# Patient Record
Sex: MALE | Race: OTHER | Employment: OTHER | URBAN - METROPOLITAN AREA
[De-identification: names, ages, dates, MRNs, and addresses within clinical notes are randomized per-mention and may not be internally consistent; named-entity substitution may affect disease eponyms.]

---

## 2018-05-10 NOTE — PATIENT DISCUSSION
REVIEWED OPTION OF AVASTIN, AND EXPLAINED PREFERENCE BY [employer], BUT PATIENT WANTS TO USE FDA APPROVED MEDICATION - THEREFORE TO PROCEED WITH A TRIAL OF EYLEA.

## 2018-05-10 NOTE — PATIENT DISCUSSION
Reviewed OPTIONS including AVASTIN/EYLEA/LUCENTIS and patient wants to proceed with EYLEA treatment AND REPEAT EVERY 5 WKS X 2 - MILD EDEMA - NEW.

## 2018-05-10 NOTE — PATIENT DISCUSSION
MILD - TO FOLLOW - IF HE HAS CAT SURGERY WOULD RECOM EVAL AND ANTI-VEGF TX FIRST TO BE ON THE SAFE SIDE.

## 2018-05-25 NOTE — PATIENT DISCUSSION
REVIEWED RISK OF INFLAMMATION WITH EYLEA AND PT WANTS TO SWITCH TO LUCENTIS AND TO REPEAT IT EVERY 5 WKS X 2.

## 2018-05-25 NOTE — PROCEDURE NOTE: CLINICAL
PROCEDURE NOTE: Lucentis 0.3mg PFS OS. Diagnosis: Diabetic Macular Edema. Prior to injection, risks/benefits/alternatives discussed including but not limited to infection, loss of vision or eye, hemorrhage, cataract, glaucoma, retinal tears or detachment. The patient wished to proceed with treatment. Topical anesthesia was induced with Alcaine. Additional anesthesia was achieved using drop(s) or injection checked above. A drop of Povidone-iodine 5% ophthalmic solution was instilled over the injection site and in the inferior fornix. Betadine prep was performed. A single use prefilled syringe of intravitreal Lucentis 0.3mg/0.05ml was used and excess discarded. The needle was passed 3.0 mm posterior to the limbus in pseudophakic patients, and 3.5 mm posterior to the limbus in phakic patients. The remainder of the Lucentis 0.3mg in the single-use vial was then discarded in a medical waste disposal container. The eye was irrigated with sterile irrigating solution. Patient tolerated the procedure well. There were no complications. Post procedure instructions given. CF vision checked. Injection Time 10:08AM. The patient was instructed to return for re-evaluation in approximately 4-12 weeks depending on his/her condition and was told to call immediately if vision decreases and/or if his/her eye becomes red, painful, and/or light sensitive. The patient was instructed to go to the emergency room or call 911 if unable to reach the doctor within an hour or two of trying or calling. Eli Durham

## 2018-05-25 NOTE — PATIENT DISCUSSION
ON 5 10 18 - REVIEWED OPTION OF AVASTIN, AND EXPLAINED PREFERENCE BY [employer], BUT PATIENT WANTS TO USE FDA APPROVED MEDICATION - THEREFORE TO PROCEED WITH A TRIAL OF EYLEA.

## 2018-07-06 NOTE — PROCEDURE NOTE: CLINICAL
PROCEDURE NOTE: Lucentis 0.3mg PFS OS. Diagnosis: Diabetic Macular Edema. Prior to injection, risks/benefits/alternatives discussed including but not limited to infection, loss of vision or eye, hemorrhage, cataract, glaucoma, retinal tears or detachment. The patient wished to proceed with treatment. Topical anesthesia was induced with Alcaine. Additional anesthesia was achieved using drop(s) or injection checked above. A drop of Povidone-iodine 5% ophthalmic solution was instilled over the injection site and in the inferior fornix. Betadine prep was performed. A single use prefilled syringe of intravitreal Lucentis 0.3mg/0.05ml was used and excess discarded. The needle was passed 3.0 mm posterior to the limbus in pseudophakic patients, and 3.5 mm posterior to the limbus in phakic patients. The remainder of the Lucentis 0.3mg in the single-use vial was then discarded in a medical waste disposal container. The eye was irrigated with sterile irrigating solution. Patient tolerated the procedure well. There were no complications. Post procedure instructions given. CF vision checked. Injection Time 10:10 AM. The patient was instructed to return for re-evaluation in approximately 4-12 weeks depending on his/her condition and was told to call immediately if vision decreases and/or if his/her eye becomes red, painful, and/or light sensitive. The patient was instructed to go to the emergency room or call 911 if unable to reach the doctor within an hour or two of trying or calling. Sandra De Paz

## 2018-08-17 NOTE — PROCEDURE NOTE: CLINICAL
PROCEDURE NOTE: Lucentis 0.3mg PFS OS. Diagnosis: Diabetic Macular Edema. Anesthesia: Topical. Prep: Betadine Flush. Prior to injection, risks/benefits/alternatives discussed including but not limited to infection, loss of vision or eye, hemorrhage, cataract, glaucoma, retinal tears or detachment. The patient wished to proceed with treatment. Topical anesthesia was induced with Alcaine. Additional anesthesia was achieved using drop(s) or injection checked above. A drop of Povidone-iodine 5% ophthalmic solution was instilled over the injection site and in the inferior fornix. Betadine prep was performed. A single use prefilled syringe of intravitreal Lucentis 0.3mg/0.05ml was used and excess discarded. The needle was passed 3.0 mm posterior to the limbus in pseudophakic patients, and 3.5 mm posterior to the limbus in phakic patients. The remainder of the Lucentis 0.3mg in the single-use vial was then discarded in a medical waste disposal container. The eye was irrigated with sterile irrigating solution. Patient tolerated the procedure well. There were no complications. Post procedure instructions given. CF vision checked. Injection Time 10:11AM. The patient was instructed to return for re-evaluation in approximately 4-12 weeks depending on his/her condition and was told to call immediately if vision decreases and/or if his/her eye becomes red, painful, and/or light sensitive. The patient was instructed to go to the emergency room or call 911 if unable to reach the doctor within an hour or two of trying or calling. Sandra De Paz

## 2018-09-28 NOTE — PATIENT DISCUSSION
LUCENTIS AND REPEAT EVERY 6 WKS X 2 - MILD EDEMA IMPROVING AT 6 WKS - BUT NOT CLEAR - RETX AND KEEP 6 WKS.

## 2018-10-05 NOTE — PROCEDURE NOTE: CLINICAL
PROCEDURE NOTE: Lucentis 0.3mg PFS OS. Diagnosis: Diabetic Macular Edema. Prior to injection, risks/benefits/alternatives discussed including but not limited to infection, loss of vision or eye, hemorrhage, cataract, glaucoma, retinal tears or detachment. The patient wished to proceed with treatment. Topical anesthesia was induced with Alcaine. Additional anesthesia was achieved using drop(s) or injection checked above. A drop of Povidone-iodine 5% ophthalmic solution was instilled over the injection site and in the inferior fornix. Betadine prep was performed. A single use prefilled syringe of intravitreal Lucentis 0.3mg/0.05ml was used and excess discarded. The needle was passed 3.0 mm posterior to the limbus in pseudophakic patients, and 3.5 mm posterior to the limbus in phakic patients. The remainder of the Lucentis 0.3mg in the single-use vial was then discarded in a medical waste disposal container. The eye was irrigated with sterile irrigating solution. Patient tolerated the procedure well. There were no complications. Post procedure instructions given. CF vision checked. Injection Time *. The patient was instructed to return for re-evaluation in approximately 4-12 weeks depending on his/her condition and was told to call immediately if vision decreases and/or if his/her eye becomes red, painful, and/or light sensitive. The patient was instructed to go to the emergency room or call 911 if unable to reach the doctor within an hour or two of trying or calling. Poli Palomares

## 2018-11-16 NOTE — PROCEDURE NOTE: CLINICAL
PROCEDURE NOTE: Lucentis 0.3mg PFS OS. Diagnosis: Diabetic Macular Edema. Anesthesia: Topical. Prep: Betadine Flush. Prior to injection, risks/benefits/alternatives discussed including but not limited to infection, loss of vision or eye, hemorrhage, cataract, glaucoma, retinal tears or detachment. The patient wished to proceed with treatment. Topical anesthesia was induced with Alcaine. Additional anesthesia was achieved using drop(s) or injection checked above. A drop of Povidone-iodine 5% ophthalmic solution was instilled over the injection site and in the inferior fornix. Betadine prep was performed. A single use prefilled syringe of intravitreal Lucentis 0.3mg/0.05ml was used and excess discarded. The needle was passed 3.0 mm posterior to the limbus in pseudophakic patients, and 3.5 mm posterior to the limbus in phakic patients. The remainder of the Lucentis 0.3mg in the single-use vial was then discarded in a medical waste disposal container. The eye was irrigated with sterile irrigating solution. Patient tolerated the procedure well. There were no complications. Post procedure instructions given. CF vision checked. Injection Time 1:32PM. The patient was instructed to return for re-evaluation in approximately 4-12 weeks depending on his/her condition and was told to call immediately if vision decreases and/or if his/her eye becomes red, painful, and/or light sensitive. The patient was instructed to go to the emergency room or call 911 if unable to reach the doctor within an hour or two of trying or calling. Anahi Toscano

## 2019-01-04 NOTE — PROCEDURE NOTE: CLINICAL
PROCEDURE NOTE: Lucentis 0.3mg PFS OS. Diagnosis: Diabetic Macular Edema. Anesthesia: Topical. Prep: Betadine Flush. Prior to injection, risks/benefits/alternatives discussed including but not limited to infection, loss of vision or eye, hemorrhage, cataract, glaucoma, retinal tears or detachment. The patient wished to proceed with treatment. Topical anesthesia was induced with Alcaine. Additional anesthesia was achieved using drop(s) or injection checked above. A drop of Povidone-iodine 5% ophthalmic solution was instilled over the injection site and in the inferior fornix. Betadine prep was performed. A single use prefilled syringe of intravitreal Lucentis 0.3mg/0.05ml was used and excess discarded. The needle was passed 3.0 mm posterior to the limbus in pseudophakic patients, and 3.5 mm posterior to the limbus in phakic patients. The remainder of the Lucentis 0.3mg in the single-use vial was then discarded in a medical waste disposal container. The eye was irrigated with sterile irrigating solution. Patient tolerated the procedure well. There were no complications. Post procedure instructions given. CF vision checked. Injection Time 1:49PM. The patient was instructed to return for re-evaluation in approximately 4-12 weeks depending on his/her condition and was told to call immediately if vision decreases and/or if his/her eye becomes red, painful, and/or light sensitive. The patient was instructed to go to the emergency room or call 911 if unable to reach the doctor within an hour or two of trying or calling. Kim Lewis

## 2019-02-15 NOTE — PROCEDURE NOTE: CLINICAL
PROCEDURE NOTE: Lucentis 0.3mg PFS OS. Diagnosis: Diabetic Macular Edema. Anesthesia: Topical. Prep: Betadine Flush. Prior to injection, risks/benefits/alternatives discussed including but not limited to infection, loss of vision or eye, hemorrhage, cataract, glaucoma, retinal tears or detachment. The patient wished to proceed with treatment. Topical anesthesia was induced with Alcaine. Additional anesthesia was achieved using drop(s) or injection checked above. A drop of Povidone-iodine 5% ophthalmic solution was instilled over the injection site and in the inferior fornix. Betadine prep was performed. A single use prefilled syringe of intravitreal Lucentis 0.3mg/0.05ml was used and excess discarded. The needle was passed 3.0 mm posterior to the limbus in pseudophakic patients, and 3.5 mm posterior to the limbus in phakic patients. The remainder of the Lucentis 0.3mg in the single-use vial was then discarded in a medical waste disposal container. The eye was irrigated with sterile irrigating solution. Patient tolerated the procedure well. There were no complications. Post procedure instructions given. CF vision checked. Injection Time *. The patient was instructed to return for re-evaluation in approximately 4-12 weeks depending on his/her condition and was told to call immediately if vision decreases and/or if his/her eye becomes red, painful, and/or light sensitive. The patient was instructed to go to the emergency room or call 911 if unable to reach the doctor within an hour or two of trying or calling. Lis Ramos

## 2019-03-29 NOTE — PATIENT DISCUSSION
LUCENTIS AND REPEAT EVERY 6 WKS X 3 - TRACE EDEMA IMPROVING AT 6 WKS - BUT NOT CLEAR - RETX AND KEEP 6 WKS.

## 2019-04-05 NOTE — PATIENT DISCUSSION
LUCENTIS AND REPEAT EVERY 6 WKS X 2 - TRACE EDEMA IMPROVING AT 6 WKS - BUT NOT CLEAR - RETX AND KEEP 6 WKS.

## 2019-04-05 NOTE — PROCEDURE NOTE: CLINICAL
PROCEDURE NOTE: Lucentis 0.3mg PFS OS. Diagnosis: Diabetic Macular Edema. Anesthesia: Topical. Prep: Betadine Flush. Prior to injection, risks/benefits/alternatives discussed including but not limited to infection, loss of vision or eye, hemorrhage, cataract, glaucoma, retinal tears or detachment. The patient wished to proceed with treatment. Topical anesthesia was induced with Alcaine. Additional anesthesia was achieved using drop(s) or injection checked above. A drop of Povidone-iodine 5% ophthalmic solution was instilled over the injection site and in the inferior fornix. Betadine prep was performed. A single use prefilled syringe of intravitreal Lucentis 0.3mg/0.05ml was used and excess discarded. The needle was passed 3.0 mm posterior to the limbus in pseudophakic patients, and 3.5 mm posterior to the limbus in phakic patients. The remainder of the Lucentis 0.3mg in the single-use vial was then discarded in a medical waste disposal container. The eye was irrigated with sterile irrigating solution. Patient tolerated the procedure well. There were no complications. Post procedure instructions given. CF vision checked. Injection Time *. The patient was instructed to return for re-evaluation in approximately 4-12 weeks depending on his/her condition and was told to call immediately if vision decreases and/or if his/her eye becomes red, painful, and/or light sensitive. The patient was instructed to go to the emergency room or call 911 if unable to reach the doctor within an hour or two of trying or calling. Naty Pelayo

## 2019-05-17 NOTE — PROCEDURE NOTE: CLINICAL
PROCEDURE NOTE: Lucentis 0.3mg PFS OS. Diagnosis: Diabetic Macular Edema. Prior to injection, risks/benefits/alternatives discussed including but not limited to infection, loss of vision or eye, hemorrhage, cataract, glaucoma, retinal tears or detachment. The patient wished to proceed with treatment. Topical anesthesia was induced with Alcaine. Additional anesthesia was achieved using drop(s) or injection checked above. A drop of Povidone-iodine 5% ophthalmic solution was instilled over the injection site and in the inferior fornix. Betadine prep was performed. A single use prefilled syringe of intravitreal Lucentis 0.3mg/0.05ml was used and excess discarded. The needle was passed 3.0 mm posterior to the limbus in pseudophakic patients, and 3.5 mm posterior to the limbus in phakic patients. The remainder of the Lucentis 0.3mg in the single-use vial was then discarded in a medical waste disposal container. The eye was irrigated with sterile irrigating solution. Patient tolerated the procedure well. There were no complications. Post procedure instructions given. CF vision checked. Injection Time 12:47PM. The patient was instructed to return for re-evaluation in approximately 4-12 weeks depending on his/her condition and was told to call immediately if vision decreases and/or if his/her eye becomes red, painful, and/or light sensitive. The patient was instructed to go to the emergency room or call 911 if unable to reach the doctor within an hour or two of trying or calling. Salvador Amezcua

## 2019-06-28 NOTE — PROCEDURE NOTE: CLINICAL
PROCEDURE NOTE: Lucentis 0.3mg PFS OS. Diagnosis: Diabetic Macular Edema. Anesthesia: Topical. Prep: Betadine Flush. Prior to injection, risks/benefits/alternatives discussed including but not limited to infection, loss of vision or eye, hemorrhage, cataract, glaucoma, retinal tears or detachment. The patient wished to proceed with treatment. Topical anesthesia was induced with Alcaine. Additional anesthesia was achieved using drop(s) or injection checked above. A drop of Povidone-iodine 5% ophthalmic solution was instilled over the injection site and in the inferior fornix. Betadine prep was performed. A single use prefilled syringe of intravitreal Lucentis 0.3mg/0.05ml was used and excess discarded. The needle was passed 3.0 mm posterior to the limbus in pseudophakic patients, and 3.5 mm posterior to the limbus in phakic patients. The remainder of the Lucentis 0.3mg in the single-use vial was then discarded in a medical waste disposal container. The eye was irrigated with sterile irrigating solution. Patient tolerated the procedure well. There were no complications. Post procedure instructions given. CF vision checked. Injection Time *. The patient was instructed to return for re-evaluation in approximately 4-12 weeks depending on his/her condition and was told to call immediately if vision decreases and/or if his/her eye becomes red, painful, and/or light sensitive. The patient was instructed to go to the emergency room or call 911 if unable to reach the doctor within an hour or two of trying or calling. Damon Ferrell

## 2019-07-10 NOTE — PATIENT DISCUSSION
Does NOT APPEAR VISUALLY SIGNIFICANT. Billing Type: Third-Party Bill Wound Care: Petrolatum Render In Bullet Format When Appropriate: No Render Post-Care Instructions In Note?: yes X Size Of Lesion In Cm: 0 Biopsy Method: 15 blade Consent: Discussed treatment options and patient had all questions answered to satisfaction prior to treatment. Oral informed consent was obtained and risks were reviewed including but not limited to  pain, infection, bleeding, scar, change of skin texture and/or color, nerve damage, blisters, allergy, and recurrence of lesion. Depth Of Biopsy: dermis Notification Instructions: Patient will be notified of biopsy results. However, patient instructed to call the office if not contacted within 2 weeks. Electrodesiccation Text: The wound bed was treated with electrodesiccation after the biopsy was performed. Detail Level: Detailed Curettage Text: The wound bed was treated with curettage after the biopsy was performed. Anesthesia Type: 1% lidocaine with epinephrine and a 1:10 solution of 8.4% sodium bicarbonate Cryotherapy Text: The wound bed was treated with cryotherapy after the biopsy was performed. Post-Care Instructions: Post op instructions reviewed and written copy offered. Silver Nitrate Text: The wound bed was treated with silver nitrate after the biopsy was performed. Biopsy Type: H and E Anesthesia Volume In Cc (Will Not Render If 0): 0.5 Dressing: bandage Type Of Destruction Used: Curettage Electrodesiccation And Curettage Text: The wound bed was treated with electrodesiccation and curettage after the biopsy was performed. Hemostasis: Aluminum Chloride and Electrocautery

## 2019-08-09 NOTE — PROCEDURE NOTE: CLINICAL
PROCEDURE NOTE: Lucentis 0.3mg PFS OS. Diagnosis: Diabetic Macular Edema. Anesthesia: Lidocaine. Prep: Betadine Flush. Prior to injection, risks/benefits/alternatives discussed including but not limited to infection, loss of vision or eye, hemorrhage, cataract, glaucoma, retinal tears or detachment. The patient wished to proceed with treatment. Topical anesthesia was induced with Alcaine. Additional anesthesia was achieved using drop(s) or injection checked above. A drop of Povidone-iodine 5% ophthalmic solution was instilled over the injection site and in the inferior fornix. Betadine prep was performed. A single use prefilled syringe of intravitreal Lucentis 0.3mg/0.05ml was used and excess discarded. The needle was passed 3.0 mm posterior to the limbus in pseudophakic patients, and 3.5 mm posterior to the limbus in phakic patients. The remainder of the Lucentis 0.3mg in the single-use vial was then discarded in a medical waste disposal container. The eye was irrigated with sterile irrigating solution. Patient tolerated the procedure well. There were no complications. Post procedure instructions given. CF vision checked. Injection Time 1:35PM. The patient was instructed to return for re-evaluation in approximately 4-12 weeks depending on his/her condition and was told to call immediately if vision decreases and/or if his/her eye becomes red, painful, and/or light sensitive. The patient was instructed to go to the emergency room or call 911 if unable to reach the doctor within an hour or two of trying or calling. Mary Beth Arnold

## 2019-09-27 NOTE — PROCEDURE NOTE: CLINICAL
PROCEDURE NOTE: Eylea 2mg OD. Diagnosis: Diabetic Macular Edema. Anesthesia: Lidocaine 4%. Prep: Betadine Drops. Prior to injection, risks/benefits/alternatives discussed including infection, loss of vision, hemorrhage, cataract, glaucoma, retinal tears or detachment. The patient wished to proceed with treatment. Betadine prep was performed. Topical anesthesia was induced with Alcaine. Additional anesthesia was achieved using drop(s) or injection checked above. A drop of Povidone-iodine 5% ophthalmic solution was instilled over the injection site and in the inferior fornix. Using the syringe provided, Eylea 2.0mg in 0.05 cc was injected into the vitreous cavity. The remainder of the Eylea in the single-use vial was then discarded in a medical waste disposal container. The needle was passed 3.0 mm posterior to the limbus in pseudophakic patients, and 3.5 mm posterior to the limbus in phakic patients. Patient tolerated procedure well. There were no complications. Injection time: *. The eye was irrigated with sterile irrigating solution. Post procedure instructions given. The patient was instructed to return for re-evaluation in approximately 4-12 weeks depending on his/her condition and was told to call immediately if vision decreases and/or if his/her eye becomes red, painful, and/or light sensitive. The patient was instructed to go to the emergency room or call 911 if unable to reach the doctor within an hour or two of trying or calling. The patient was instructed to use Artificial Tears q.i.d. p.r.n for comfort. Santos Knutson

## 2019-11-08 NOTE — PROCEDURE NOTE: CLINICAL
PROCEDURE NOTE: Eylea 2mg OD. Diagnosis: Diabetic Macular Edema. Anesthesia: Lidocaine 4%. Prep: Betadine Drops. Prior to injection, risks/benefits/alternatives discussed including infection, loss of vision, hemorrhage, cataract, glaucoma, retinal tears or detachment. The patient wished to proceed with treatment. Betadine prep was performed. Topical anesthesia was induced with Alcaine. Additional anesthesia was achieved using drop(s) or injection checked above. A drop of Povidone-iodine 5% ophthalmic solution was instilled over the injection site and in the inferior fornix. Using the syringe provided, Eylea 2.0mg in 0.05 cc was injected into the vitreous cavity. The remainder of the Eylea in the single-use vial was then discarded in a medical waste disposal container. The needle was passed 3.0 mm posterior to the limbus in pseudophakic patients, and 3.5 mm posterior to the limbus in phakic patients. Patient tolerated procedure well. There were no complications. Injection time: *. The eye was irrigated with sterile irrigating solution. Post procedure instructions given. The patient was instructed to return for re-evaluation in approximately 4-12 weeks depending on his/her condition and was told to call immediately if vision decreases and/or if his/her eye becomes red, painful, and/or light sensitive. The patient was instructed to go to the emergency room or call 911 if unable to reach the doctor within an hour or two of trying or calling. The patient was instructed to use Artificial Tears q.i.d. p.r.n for comfort. Sarah Abdullahi

## 2020-01-03 NOTE — PROCEDURE NOTE: CLINICAL
PROCEDURE NOTE: Eylea 2mg OD. Diagnosis: Diabetic Macular Edema. Anesthesia: Lidocaine 4%. Prep: Betadine Drops. Prior to injection, risks/benefits/alternatives discussed including infection, loss of vision, hemorrhage, cataract, glaucoma, retinal tears or detachment. The patient wished to proceed with treatment. Betadine prep was performed. Topical anesthesia was induced with Alcaine. Additional anesthesia was achieved using drop(s) or injection checked above. A drop of Povidone-iodine 5% ophthalmic solution was instilled over the injection site and in the inferior fornix. Using the syringe provided, Eylea 2.0mg in 0.05 cc was injected into the vitreous cavity. The remainder of the Eylea in the single-use vial was then discarded in a medical waste disposal container. The needle was passed 3.0 mm posterior to the limbus in pseudophakic patients, and 3.5 mm posterior to the limbus in phakic patients. Patient tolerated procedure well. There were no complications. Injection time: 10:50 AM. The eye was irrigated with sterile irrigating solution. Post procedure instructions given. The patient was instructed to return for re-evaluation in approximately 4-12 weeks depending on his/her condition and was told to call immediately if vision decreases and/or if his/her eye becomes red, painful, and/or light sensitive. The patient was instructed to go to the emergency room or call 911 if unable to reach the doctor within an hour or two of trying or calling. The patient was instructed to use Artificial Tears q.i.d. p.r.n for comfort. Judy Gonzalez

## 2020-03-17 ENCOUNTER — LASIK CONSULTATION (OUTPATIENT)
Dept: URBAN - METROPOLITAN AREA CLINIC 39 | Facility: CLINIC | Age: 60
End: 2020-03-17

## 2020-03-17 DIAGNOSIS — H52.7: ICD-10-CM

## 2020-03-17 DIAGNOSIS — Z98.890: ICD-10-CM

## 2020-03-17 PROCEDURE — 92015 DETERMINE REFRACTIVE STATE: CPT

## 2020-03-17 PROCEDURE — 92136TCNC INTERFEROMETRY -TECHNICAL COMPONENT NO CHARGE

## 2020-03-17 PROCEDURE — 92025NC COMP. CORNEAL TOPO, UNI OR BILAT,

## 2020-03-17 PROCEDURE — V2799PMN IMPRIMIS PRED-MOXI-NEPAF 5ML

## 2020-03-17 PROCEDURE — 99499RLE REFRACTIVE CONSULT/RLE

## 2020-03-17 PROCEDURE — 92134 CPTRZ OPH DX IMG PST SGM RTA: CPT

## 2020-03-17 ASSESSMENT — VISUAL ACUITY
OD_CC: J3
OS_SC: >J12
OD_SC: 20/60+2
OD_SC: >J12
OD_CC: 20/25-1
OS_SC: 20/50+1
OS_CC: J6
OS_CC: 20/20

## 2020-03-17 ASSESSMENT — TONOMETRY
OS_IOP_MMHG: 18
OD_IOP_MMHG: 19

## 2020-03-20 NOTE — PROCEDURE NOTE: CLINICAL
PROCEDURE NOTE: Eylea PFS OD. Diagnosis: Diabetic Macular Edema. Prior to injection, risks/benefits/alternatives discussed including but not limited to infection, loss of vision or eye, hemorrhage, cataract, glaucoma, retinal tears or detachment. The patient wished to proceed with treatment. Betadine prep was performed. Topical anesthesia was induced with Alcaine. Additional anesthesia was achieved using drop(s) or injection checked above. A drop of Povidone-iodine 5% ophthalmic solution was instilled over the injection site and in the inferior fornix. A single use prefilled syringe of intravitreal Eylea 2mg/0.05ml was used and excess was disposed of as waste. The needle was passed 3.0 mm posterior to the limbus in pseudophakic patients, and 3.5 mm posterior to the limbus in phakic patients. Injection time: 9:20 AM.  Patient tolerated procedure well. There were no complications. The eye was irrigated with sterile irrigating solution. Post procedure instructions given. The patient was instructed to use Artificial Tears q.i.d. p.r.n for comfort. The patient was instructed to return for re-evaluation in approximately 4-12 weeks depending on his/her condition and was told to call immediately if vision decreases and/or if his/her eye becomes red, painful, and/or light sensitive. The patient was instructed to go to the emergency room or call 911 if unable to reach the doctor within an hour or two of trying or calling. David Cherry

## 2020-05-01 NOTE — PROCEDURE NOTE: CLINICAL

## 2020-05-07 ENCOUNTER — SURGERY/PROCEDURE (OUTPATIENT)
Dept: URBAN - METROPOLITAN AREA CLINIC 39 | Facility: CLINIC | Age: 60
End: 2020-05-07

## 2020-05-07 ENCOUNTER — ESTABLISHED PATIENT (OUTPATIENT)
Dept: URBAN - METROPOLITAN AREA SURGERY 14 | Facility: SURGERY | Age: 60
End: 2020-05-07

## 2020-05-07 DIAGNOSIS — H52.7: ICD-10-CM

## 2020-05-07 DIAGNOSIS — Z98.890: ICD-10-CM

## 2020-05-07 PROCEDURE — 66999LNSR LENSAR LASER FOR CAT SX

## 2020-05-07 PROCEDURE — 66999RAV RLE WITH ADVANCED LENS

## 2020-05-07 PROCEDURE — 99211HP H&P OFFICE/OUTPATIENT VISIT, EST

## 2020-05-08 ENCOUNTER — CATARACT POST-OP 1-DAY (OUTPATIENT)
Dept: URBAN - METROPOLITAN AREA CLINIC 35 | Facility: CLINIC | Age: 60
End: 2020-05-08

## 2020-05-08 DIAGNOSIS — Z96.1: ICD-10-CM

## 2020-05-08 DIAGNOSIS — H52.7: ICD-10-CM

## 2020-05-08 DIAGNOSIS — Z98.890: ICD-10-CM

## 2020-05-08 PROCEDURE — 99024 POSTOP FOLLOW-UP VISIT: CPT

## 2020-05-08 ASSESSMENT — VISUAL ACUITY
OD_SC: 20/60
OS_SC: 20/50

## 2020-05-08 ASSESSMENT — TONOMETRY
OS_IOP_MMHG: 18
OD_IOP_MMHG: 19

## 2020-05-12 ENCOUNTER — POST OP/EVAL OF SECOND EYE (OUTPATIENT)
Dept: URBAN - METROPOLITAN AREA CLINIC 35 | Facility: CLINIC | Age: 60
End: 2020-05-12

## 2020-05-12 DIAGNOSIS — Z96.1: ICD-10-CM

## 2020-05-12 PROCEDURE — 99024 POSTOP FOLLOW-UP VISIT: CPT

## 2020-05-12 ASSESSMENT — TONOMETRY
OD_IOP_MMHG: 18
OS_IOP_MMHG: 18

## 2020-05-12 ASSESSMENT — VISUAL ACUITY
OS_SC: 20/40
OD_SC: 20/50+2

## 2020-05-14 ENCOUNTER — FOLLOW UP (OUTPATIENT)
Dept: URBAN - METROPOLITAN AREA CLINIC 39 | Facility: CLINIC | Age: 60
End: 2020-05-14

## 2020-05-14 ENCOUNTER — ESTABLISHED PATIENT (OUTPATIENT)
Dept: URBAN - METROPOLITAN AREA SURGERY 14 | Facility: SURGERY | Age: 60
End: 2020-05-14

## 2020-05-14 ENCOUNTER — SURGERY/PROCEDURE (OUTPATIENT)
Dept: URBAN - METROPOLITAN AREA CLINIC 39 | Facility: CLINIC | Age: 60
End: 2020-05-14

## 2020-05-14 DIAGNOSIS — Z98.890: ICD-10-CM

## 2020-05-14 DIAGNOSIS — Z96.1: ICD-10-CM

## 2020-05-14 DIAGNOSIS — H52.7: ICD-10-CM

## 2020-05-14 PROCEDURE — 66999LNSR LENSAR LASER FOR CAT SX

## 2020-05-14 PROCEDURE — 66999RAV RLE WITH ADVANCED LENS

## 2020-05-14 PROCEDURE — 99211T TECH SERVICE

## 2020-05-14 PROCEDURE — 99211HP H&P OFFICE/OUTPATIENT VISIT, EST

## 2020-05-14 ASSESSMENT — VISUAL ACUITY
OD_SC: J1
OD_SC: 20/25+2

## 2020-05-15 ENCOUNTER — CATARACT POST-OP 1-DAY (OUTPATIENT)
Dept: URBAN - METROPOLITAN AREA CLINIC 35 | Facility: CLINIC | Age: 60
End: 2020-05-15

## 2020-05-15 ENCOUNTER — PREPPED CHART (OUTPATIENT)
Dept: URBAN - METROPOLITAN AREA CLINIC 35 | Facility: CLINIC | Age: 60
End: 2020-05-15

## 2020-05-15 DIAGNOSIS — Z96.1: ICD-10-CM

## 2020-05-15 PROCEDURE — 99024 POSTOP FOLLOW-UP VISIT: CPT

## 2020-05-15 ASSESSMENT — VISUAL ACUITY
OU_SC: 20/25
OS_SC: 20/40
OU_SC: J3
OD_SC: 20/30

## 2020-05-15 ASSESSMENT — TONOMETRY
OS_IOP_MMHG: 31
OD_IOP_MMHG: 20

## 2020-06-10 ENCOUNTER — POST-OP CATARACT (OUTPATIENT)
Dept: URBAN - METROPOLITAN AREA CLINIC 35 | Facility: CLINIC | Age: 60
End: 2020-06-10

## 2020-06-10 DIAGNOSIS — Z96.1: ICD-10-CM

## 2020-06-10 PROCEDURE — 99024 POSTOP FOLLOW-UP VISIT: CPT

## 2020-06-10 ASSESSMENT — TONOMETRY
OS_IOP_MMHG: 16
OD_IOP_MMHG: 16

## 2020-06-10 ASSESSMENT — VISUAL ACUITY
OU_SC: 20/25
OS_SC: 20/30-2
OD_SC: 20/25
OU_SC: J3

## 2020-06-12 NOTE — PATIENT DISCUSSION
EYLEA AND REPEAT EVERY 6 WKS X 1 DOI - MILD  EDEMA 6 WKS - SLOWLEY IMPROVING - UNABLE TO EXTEND - RETX AND KEEP 6 WKS.

## 2020-06-12 NOTE — PROCEDURE NOTE: CLINICAL

## 2020-06-26 NOTE — PROCEDURE NOTE: CLINICAL
PROCEDURE NOTE: Eylea PFS OS. Diagnosis: Diabetic Macular Edema. Anesthesia: Lidocaine 4%. Prep: Betadine Drops. Prior to injection, risks/benefits/alternatives discussed including but not limited to infection, loss of vision or eye, hemorrhage, cataract, glaucoma, retinal tears or detachment. The patient wished to proceed with treatment. Betadine prep was performed. Topical anesthesia was induced with Alcaine. Additional anesthesia was achieved using drop(s) or injection checked above. A drop of Povidone-iodine 5% ophthalmic solution was instilled over the injection site and in the inferior fornix. A single use prefilled syringe of intravitreal Eylea 2mg/0.05ml was used and excess was disposed of as waste. The needle was passed 3.0 mm posterior to the limbus in pseudophakic patients, and 3.5 mm posterior to the limbus in phakic patients. Injection time: 550. Patient tolerated procedure well. There were no complications. The eye was irrigated with sterile irrigating solution. Post procedure instructions given. The patient was instructed to use Artificial Tears q.i.d. p.r.n for comfort. The patient was instructed to return for re-evaluation in approximately 4-12 weeks depending on his/her condition and was told to call immediately if vision decreases and/or if his/her eye becomes red, painful, and/or light sensitive. The patient was instructed to go to the emergency room or call 911 if unable to reach the doctor within an hour or two of trying or calling. Arti Van

## 2020-07-31 NOTE — PROCEDURE NOTE: CLINICAL
PROCEDURE NOTE: Eylea PFS OD. Diagnosis: Diabetic Macular Edema. Prior to injection, risks/benefits/alternatives discussed including but not limited to infection, loss of vision or eye, hemorrhage, cataract, glaucoma, retinal tears or detachment. The patient wished to proceed with treatment. Betadine prep was performed. Topical anesthesia was induced with Alcaine. Additional anesthesia was achieved using drop(s) or injection checked above. A drop of Povidone-iodine 5% ophthalmic solution was instilled over the injection site and in the inferior fornix. A single use prefilled syringe of intravitreal Eylea 2mg/0.05ml was used and excess was disposed of as waste. The needle was passed 3.0 mm posterior to the limbus in pseudophakic patients, and 3.5 mm posterior to the limbus in phakic patients. Injection time: 10:00 AM.  Patient tolerated procedure well. There were no complications. The eye was irrigated with sterile irrigating solution. Post procedure instructions given. The patient was instructed to use Artificial Tears q.i.d. p.r.n for comfort. The patient was instructed to return for re-evaluation in approximately 4-12 weeks depending on his/her condition and was told to call immediately if vision decreases and/or if his/her eye becomes red, painful, and/or light sensitive. The patient was instructed to go to the emergency room or call 911 if unable to reach the doctor within an hour or two of trying or calling. Arti Van

## 2020-07-31 NOTE — PATIENT DISCUSSION
EYLEA AND RE-EVAL IN 6 WKS - MILD  EDEMA 6 WKS - SLOWLY IMPROVING - UNABLE TO EXTEND - RETX AND KEEP 6 WKS.

## 2020-09-11 NOTE — PATIENT DISCUSSION
EYLEA AND REPEAT EVERY 5 WKS X 3  - MILD  EDEMA 6 WKS - SLOWLY IMPROVING - UNABLE TO EXTEND - RETX AND REDUCE TO 5 WKS.

## 2020-09-11 NOTE — PATIENT DISCUSSION
Cataract APPEARS VISUALLY SIGNIFICANT and patient advised to SEE DR SPARKS for evaluation and treatment.

## 2020-09-11 NOTE — PROCEDURE NOTE: CLINICAL

## 2020-10-30 NOTE — PROCEDURE NOTE: CLINICAL
PROCEDURE NOTE: Eylea PFS OD. Diagnosis: Diabetic Macular Edema. Prior to injection, risks/benefits/alternatives discussed including but not limited to infection, loss of vision or eye, hemorrhage, cataract, glaucoma, retinal tears or detachment. The patient wished to proceed with treatment. Betadine prep was performed. Topical anesthesia was induced with Alcaine. Additional anesthesia was achieved using drop(s) or injection checked above. A drop of Povidone-iodine 5% ophthalmic solution was instilled over the injection site and in the inferior fornix. A single use prefilled syringe of intravitreal Eylea 2mg/0.05ml was used and excess was disposed of as waste. The needle was passed 3.0 mm posterior to the limbus in pseudophakic patients, and 3.5 mm posterior to the limbus in phakic patients. Injection time: 2:30PM.  Patient tolerated procedure well. There were no complications. The eye was irrigated with sterile irrigating solution. Post procedure instructions given. The patient was instructed to use Artificial Tears q.i.d. p.r.n for comfort. The patient was instructed to return for re-evaluation in approximately 4-12 weeks depending on his/her condition and was told to call immediately if vision decreases and/or if his/her eye becomes red, painful, and/or light sensitive. The patient was instructed to go to the emergency room or call 911 if unable to reach the doctor within an hour or two of trying or calling. Flaca Blackwell

## 2020-10-30 NOTE — PATIENT DISCUSSION
10 30 20 EYLEA AND REPEAT EVERY 5 WKS X 2  - MILD  EDEMA 7 WKS - - UNABLE TO EXTEND - RETX AND KEEP 5 WKS - ON FURTHER DISCUSSION PT CAN NOT RETURN TILL JANUARY - TO EXTEND TO JANUARY AND SEE HOW HE IS RESPONDING TO LONGER F/U.

## 2021-01-08 NOTE — PROCEDURE NOTE: CLINICAL

## 2021-01-08 NOTE — PATIENT DISCUSSION
EYLEA AND REPEAT EVERY 5 WKS X 3 - MILD EDEMA AT 10 WKS - MILD INCREASE - RETX AND REDUCE BACK TO 5 WKS F/U.

## 2021-02-12 NOTE — PROCEDURE NOTE: CLINICAL
PROCEDURE NOTE: Eylea PFS OD. Diagnosis: Diabetic Macular Edema. Anesthesia: Topical. Prep: Betadine Drops. Prior to injection, risks/benefits/alternatives discussed including but not limited to infection, loss of vision or eye, hemorrhage, cataract, glaucoma, retinal tears or detachment. The patient wished to proceed with treatment. Betadine prep was performed. Topical anesthesia was induced with Alcaine. Additional anesthesia was achieved using drop(s) or injection checked above. A drop of Povidone-iodine 5% ophthalmic solution was instilled over the injection site and in the inferior fornix. A single use prefilled syringe of intravitreal Eylea 2mg/0.05ml was used and excess was disposed of as waste. The needle was passed 3.0 mm posterior to the limbus in pseudophakic patients, and 3.5 mm posterior to the limbus in phakic patients. Injection time: 2:15PM.  Patient tolerated procedure well. There were no complications. The eye was irrigated with sterile irrigating solution. Post procedure instructions given. The patient was instructed to use Artificial Tears q.i.d. p.r.n for comfort. The patient was instructed to return for re-evaluation in approximately 4-12 weeks depending on his/her condition and was told to call immediately if vision decreases and/or if his/her eye becomes red, painful, and/or light sensitive. The patient was instructed to go to the emergency room or call 911 if unable to reach the doctor within an hour or two of trying or calling. Anahi Toscano

## 2021-03-19 NOTE — PROCEDURE NOTE: CLINICAL

## 2021-04-09 ENCOUNTER — ESTABLISHED COMPREHENSIVE EXAM (OUTPATIENT)
Dept: URBAN - METROPOLITAN AREA CLINIC 35 | Facility: CLINIC | Age: 61
End: 2021-04-09

## 2021-04-09 DIAGNOSIS — H26.493: ICD-10-CM

## 2021-04-09 DIAGNOSIS — Z96.1: ICD-10-CM

## 2021-04-09 PROCEDURE — 92014 COMPRE OPH EXAM EST PT 1/>: CPT

## 2021-04-09 ASSESSMENT — VISUAL ACUITY
OD_SC: 20/40-2
OD_PH: 20/25
OS_SC: 20/30
OS_SC: J4-
OD_SC: J1-

## 2021-04-09 ASSESSMENT — TONOMETRY
OS_IOP_MMHG: 15
OD_IOP_MMHG: 15

## 2021-06-04 NOTE — PATIENT DISCUSSION
EYLEA THEN REEVAL IN 7 WKS - MILD EDEMA - NO SIG IMPROVMENT WITH ELYEA AT 5 WKS - REVIEWED OPTION SWITHCING TO OZURDEX VS TX EYLEA - HE WANTS EYLEA AND IF NO INCREASE OR IMPROVMENT IN 7 WKS - TO SWITCH TO OZURDEX - TO GET APPROVAL IF NEEDED.

## 2021-06-04 NOTE — PROCEDURE NOTE: CLINICAL

## 2021-06-12 NOTE — PATIENT DISCUSSION
ERM appears BODERLINE VISUALLY SIGNIFICANT. Patient is disoriented x 4, up with assistance of two, does not follow commands. Vital signs stable on 3 L of oxygen via  [place]. Lung sounds diminished. Bowel sounds active. Patient had swallow evaluation this AM  medication administration  recommendation crushed in honey thick liquid. NPO status maintained oral cares done with swabs dipped in thin water. Sips of honey thick ice water given for comfort. Patient pulled out IV access and disconnected other line stated that they are not necessary. Noe in place low urine out MD notified order received  N/S 500 ML bolus. Start maintenance IV  Fluid after bolus. Continue to monitor.

## 2021-07-23 NOTE — PATIENT DISCUSSION
Capsular haze appears visually significant, RECOMMEND CONSULT with  [facility] surgeon for possible YAG capsulotomy.

## 2021-07-23 NOTE — PROCEDURE NOTE: CLINICAL
PROCEDURE NOTE: Intravitreal Ozurdex OD. Diagnosis: Diabetic Macular Edema. Anesthesia: Topical. Prep: Betadine Flush. Prior to injection, risks/benefits/alternatives discussed including infection, loss of vision, hemorrhage, cataract, glaucoma, retinal tears or detachment and patient wished to proceed. The patient was seated in the examination chair. Topical anesthesia was induced with Proparicaine 0.5%. Subconjunctival xylocaine 2% approximately 0.5 cc was given for anesthesia. Betadine Prep was performed. The Ozurdex drug implant (dexamethasone 0.7 mg intravitreal implant) was injected into the vitreous cavity. The needle was passed 3.0 mm posterior to the limbus in pseudophakic patients, and 3.5 mm posterior to the limbus in phakic patients. The eye was stabilized using a q tip or cotton tip applicator, and the conjunctiva was displaced from the injection site. The remainder of the intravitreal Ozurdex in the single-use vial was then discarded in a medical waste disposal container. The implant settled inferiorly. The retina was examined following the injection. There was no evidence of hemorrhage, subretinal injection, or retinal detachment. Patient tolerated procedure well. There were no complications. Post-op instructions given. Lot # *. Expiration date: */*. The patient was instructed of a follow up appointment in approximately 6 weeks for a limited exam and pressure check and was told to call immediately if the vision decreases and/or if the patient’s eye becomes red, painful, and/or light sensitive. If the patient is unable to reach the doctor within an hour or two, the patient is instructed to go to the emergency room or call 911. Inventory Id: *. The patient was instructed to use Artificial Tears q.i.d. p.r.n for comfort. Carla Prather

## 2021-08-06 NOTE — PATIENT DISCUSSION
Capsular haze appears visually significant, RECOMMEND CONSULT with DR Garry Brenner surgeon for possible YAG capsulotomy.

## 2021-09-10 NOTE — PATIENT DISCUSSION
Capsular haze appears visually significant, RECOMMEND CONSULT with  [facility] surgeon for possible YAG capsulotomy.

## 2021-10-22 NOTE — PROCEDURE NOTE: CLINICAL
PROCEDURE NOTE: Eylea PFS OD. Diagnosis: Diabetic Macular Edema. Anesthesia: Topical. Prep: Betadine Drops. Prior to injection, risks/benefits/alternatives discussed including but not limited to infection, loss of vision or eye, hemorrhage, cataract, glaucoma, retinal tears or detachment. The patient wished to proceed with treatment. Betadine prep was performed. Topical anesthesia was induced with Alcaine. Additional anesthesia was achieved using drop(s) or injection checked above. A drop of Povidone-iodine 5% ophthalmic solution was instilled over the injection site and in the inferior fornix. A single use prefilled syringe of intravitreal Eylea 2mg/0.05ml was used and excess was disposed of as waste. The needle was passed 3.0 mm posterior to the limbus in pseudophakic patients, and 3.5 mm posterior to the limbus in phakic patients. Injection time: 6:00 PM.  Patient tolerated procedure well. There were no complications. The eye was irrigated with sterile irrigating solution. Post procedure instructions given. The patient was instructed to use Artificial Tears q.i.d. p.r.n for comfort. The patient was instructed to return for re-evaluation in approximately 4-12 weeks depending on his/her condition and was told to call immediately if vision decreases and/or if his/her eye becomes red, painful, and/or light sensitive. The patient was instructed to go to the emergency room or call 911 if unable to reach the doctor within an hour or two of trying or calling. Jesusita Spann

## 2021-10-22 NOTE — PATIENT DISCUSSION
10 22 21 IOP IMPROVED ON T 1/2 - TO STOP WHEN IT DROPS RUN OUT TO SEE IF ^ IN IOP WAS ENTIRELY STEROID INDUCED.

## 2021-11-05 NOTE — PATIENT DISCUSSION
2 drops in left eye every 6 hours for next 7 days.  Symptoms do not start to improve in 48 hours or should be reevaluated.  You not return to work until you have been taking medication for more than 24 hours.    Any change in vision, worsening pain, or other new symptoms you find concerning you should return to emergency department.  Otherwise follow-up with primary care provider.   Continue to MONITOR CLOSELY to determine the need for TREATMENT and INCREASE/DECREASE in length of time till next follow up visit.

## 2022-01-14 NOTE — PROCEDURE NOTE: CLINICAL

## 2022-02-04 NOTE — PATIENT DISCUSSION
Capsular haze appears visually significant, RECOMMEND CONSULT with DR SPARKS for possible YAG capsulotomy.

## 2022-02-04 NOTE — PROCEDURE NOTE: CLINICAL
PROCEDURE NOTE: Eylea PFS OS. Diagnosis: Diabetic Macular Edema. Anesthesia: Lidocaine 4%. Prep: Betadine Drops. Prior to injection, risks/benefits/alternatives discussed including but not limited to infection, loss of vision or eye, hemorrhage, cataract, glaucoma, retinal tears or detachment. The patient wished to proceed with treatment. Betadine prep was performed. Topical anesthesia was induced with Alcaine. Additional anesthesia was achieved using drop(s) or injection checked above. A drop of Povidone-iodine 5% ophthalmic solution was instilled over the injection site and in the inferior fornix. A single use prefilled syringe of intravitreal Eylea 2mg/0.05ml was used and excess was disposed of as waste. The needle was passed 3.0 mm posterior to the limbus in pseudophakic patients, and 3.5 mm posterior to the limbus in phakic patients. Injection time: 9:15AM.  Patient tolerated procedure well. There were no complications. The eye was irrigated with sterile irrigating solution. Post procedure instructions given. The patient was instructed to use Artificial Tears q.i.d. p.r.n for comfort. The patient was instructed to return for re-evaluation in approximately 4-12 weeks depending on his/her condition and was told to call immediately if vision decreases and/or if his/her eye becomes red, painful, and/or light sensitive. The patient was instructed to go to the emergency room or call 911 if unable to reach the doctor within an hour or two of trying or calling. Mary Beth Arnold

## 2022-02-28 NOTE — PATIENT DISCUSSION
1 14 22 IMPROVED - EYLEA AND EXTEND TO @ 10 WKS.
1 14 22 MILD EDEMA - RESUME EYLEA AT 5 WKS.
10 22 21 IOP IMPROVED ON T 1/2 - TO STOP WHEN IT DROPS RUN OUT TO SEE IF ^ IN IOP WAS ENTIRELY STEROID INDUCED.
10 22 21 MILD EDEMA - TO SEE IF EYLEA WILL CLEAR UP REMAINING EDEMA.
10 30 20 DOING WELL S/P CAT SURGERY - TO PROCEED WITH TX AS PLANNED.
10 30 20 EYLEA AND REPEAT EVERY 5 WKS X 2  - MILD  EDEMA 7 WKS - - UNABLE TO EXTEND - RETX AND KEEP 5 WKS - ON FURTHER DISCUSSION PT CAN NOT RETURN TILL JANUARY - TO EXTEND TO JANUARY AND SEE HOW HE IS RESPONDING TO LONGER F/U.
8/6/2021 [facility]NL KEEP SCHEDULED F/U.
9 10 21 IOP 26 - COMBIGAN AND LUMIGAN X 1 - THEN START T 1/2 AND F/U DR SPARKS IN 2-3 WKS.
9 10 21 MILD IMPROVEMENT 6 WKS S/P OZURDEX.
ARTIFICIAL TEARS to affected eye(s) as needed.
BMI above normal limits, recommended weight loss, improve diet and follow up with internist.
Capsular haze appears visually significant, RECOMMEND CONSULT with DR SPARKS for possible YAG capsulotomy.
Continue to MONITOR CLOSELY to determine the need for TREATMENT and INCREASE/DECREASE in length of time till next follow up visit.
Discussed the importance of blood sugar and blood pressure control and keeping the HA1C < 6.5%.
Does NOT APPEAR VISUALLY SIGNIFICANT.
ERM appears BODERLINE VISUALLY SIGNIFICANT.
ERM does NOT APPEAR VISUALLY SIGNIFICANT.
EYLEA - MILD EDEMA INCREASING 6 12 20 - RETX AND REPEAT IN 5 WKS DOI.
EYLEA AND REPEAT EVERY 5 WKS X 3 - MILD EDEMA AT 10 WKS - MILD INCREASE - RETX AND REDUCE BACK TO 5 WKS F/U.
EYLEA THEN REEVAL IN 7 WKS - MILD EDEMA - NO SIG IMPROVMENT WITH ELYEA AT 5 WKS - REVIEWED OPTION SWITHCING TO OZURDEX VS TX EYLEA - HE WANTS EYLEA AND IF NO INCREASE OR IMPROVMENT IN 7 WKS - TO SWITCH TO OZURDEX - TO GET APPROVAL IF NEEDED.
IMPROVED TO FOLLOW OFF MEDS - TO CONTINUE TO FOLLOW.
INCREASED SINCE 10/26/16.
IOP within reasonable range TODAY.
LUCENTIS AND REPEAT EVERY 6 WKS X 2 - TRACE EDEMA IMPROVING AT 6 WKS - BUT NOT CLEAR - RETX AND KEEP 6 WKS.
MILD NASAL STEP.
MILDLY ELEVATED IOP BUT NO SIG DISC CHANGES.
MONITOR response to TREATMENT.
My findings and recommendations TODAY are based on the patient's symptoms, exam, diagnostic testing and records.
NO PROGRESSION on Imaging SINCE LAST VISIT.
NONSMOKER.
ON 5 10 18 - REVIEWED OPTION OF AVASTIN, AND EXPLAINED PREFERENCE BY [employer], BUT PATIENT WANTS TO USE FDA APPROVED MEDICATION - THEREFORE TO PROCEED WITH A TRIAL OF EYLEA.
ONGOING.
OZURDEX 7 23 21 - MILD EDEMA W/OUT IMPROVEMENT WITH EYLEA - TO TRY SWITCHING TO OZURDEX.
POST INJECTION EVALUATION, no signs of new infection, tear, RD, VF, EOM, CNS, Vascular or other problems or side effect from previous injection(s).
RECOM EVAL DR TODD.
RESOLVED 7 23 21 - DIFFERENT SIZE BUT NO DOUBLE VA.
REVIEWED RISK OF INFLAMMATION WITH EYLEA AND PT WANTS TO SWITCH TO LUCENTIS AND TO REPEAT IT EVERY 5 WKS X 2.
Recommend OBSERVATION and continued MONITORING for progression.
Recommended OBSERVATION and MONITORING for change.
Recommended OBSERVATION and continued MONITORING for progression.
Recommended Observation.
Retinal exam findings communicated to Physician managing diabetes.
Reviewed OPTIONS including AVASTIN/EYLEA/LUCENTIS and patient wants to proceed with EYLEA treatment AND REPEAT EVERY 5 WKS X 2 - MILD EDEMA - NEW.
THEN RETREAT WITHIN 10 WEEKS TIMES 1-4 DOI.
THEN RETREAT WITHIN 16 WEEKS TIMES 1-4 DOI.
TRACE EDEMA - TO SEE HOW PT DOES OFF MEDS.
The level of diabetic retinopathy was communicated to provider.
The presence of macula edema findings were communicated to provider.
Well positioned.
no

## 2022-03-11 NOTE — PROCEDURE NOTE: CLINICAL
PROCEDURE NOTE: Eylea PFS OS. Diagnosis: Diabetic Macular Edema. Anesthesia: Lidocaine 4%. Prep: Betadine Drops. Prior to injection, risks/benefits/alternatives discussed including but not limited to infection, loss of vision or eye, hemorrhage, cataract, glaucoma, retinal tears or detachment. The patient wished to proceed with treatment. Betadine prep was performed. Topical anesthesia was induced with Alcaine. Additional anesthesia was achieved using drop(s) or injection checked above. A drop of Povidone-iodine 5% ophthalmic solution was instilled over the injection site and in the inferior fornix. A single use prefilled syringe of intravitreal Eylea 2mg/0.05ml was used and excess was disposed of as waste. The needle was passed 3.0 mm posterior to the limbus in pseudophakic patients, and 3.5 mm posterior to the limbus in phakic patients. Injection time: 9:00AM.  Patient tolerated procedure well. There were no complications. The eye was irrigated with sterile irrigating solution. Post procedure instructions given. The patient was instructed to use Artificial Tears q.i.d. p.r.n for comfort. The patient was instructed to return for re-evaluation in approximately 4-12 weeks depending on his/her condition and was told to call immediately if vision decreases and/or if his/her eye becomes red, painful, and/or light sensitive. The patient was instructed to go to the emergency room or call 911 if unable to reach the doctor within an hour or two of trying or calling. Katherin Dowell

## 2022-05-25 NOTE — PROCEDURE NOTE: CLINICAL
PROCEDURE NOTE: Eylea Sample OD. Diagnosis: Diabetic Macular Edema. Anesthesia: Lidocaine 4%. Prep: Betadine Drops. Prior to injection, risks/benefits/alternatives discussed including infection, loss of vision, hemorrhage, cataract, glaucoma, retinal tears or detachment. The patient wished to proceed with treatment. Betadine prep was performed. Topical anesthesia was induced with Alcaine. Additional anesthesia was achieved using drop(s) or injection checked above. A drop of Povidone-iodine 5% ophthalmic solution was instilled over the injection site and in the inferior fornix. Using the syringe provided, Eylea 2.0mg in 0.05 cc was injected into the vitreous cavity. The remainder of the Eylea in the single-use vial was then discarded in a medical waste disposal container. The needle was passed 3.0 mm posterior to the limbus in pseudophakic patients, and 3.5 mm posterior to the limbus in phakic patients. The eye was irrigated with sterile irrigating solution. Patient tolerated procedure well. There were no complications. Post procedure instructions given. Injection time: 9:30AM. The patient was instructed to return for re-evaluation in approximately 4-12 weeks depending on his/her condition and was told to call immediately if vision decreases and/or if his/her eye becomes red, painful, and/or light sensitive. The patient was instructed to go to the emergency room or call 911 if unable to reach the doctor within an hour or two of trying or calling. The patient was instructed to use Artificial Tears q.i.d. p.r.n for comfort. Matt Rodriguez

## 2022-09-27 NOTE — PATIENT DISCUSSION
Labs mailed to patient.   ON 5 10 18 - REVIEWED OPTION OF AVASTIN, AND EXPLAINED PREFERENCE BY [employer], BUT PATIENT WANTS TO USE FDA APPROVED MEDICATION - THEREFORE TO PROCEED WITH A TRIAL OF EYLEA.

## 2023-10-30 NOTE — PATIENT DISCUSSION
MONITOR response to TREATMENT. Pharmacy requesting refill of gabapentin. Last filled 09/25/2023    Last seen 08/07/2023. Follow up is scheduled for 11/14/2023.

## 2024-01-26 NOTE — PATIENT DISCUSSION
Looking at the ingredients, that supplement is safe to take.     NO PROGRESSION on Imaging SINCE LAST VISIT.

## 2024-03-19 ENCOUNTER — EMERGENCY VISIT (OUTPATIENT)
Dept: URBAN - METROPOLITAN AREA CLINIC 35 | Facility: CLINIC | Age: 64
End: 2024-03-19

## 2024-03-19 DIAGNOSIS — H43.813: ICD-10-CM

## 2024-03-19 DIAGNOSIS — H26.493: ICD-10-CM

## 2024-03-19 DIAGNOSIS — H16.223: ICD-10-CM

## 2024-03-19 PROCEDURE — 92014 COMPRE OPH EXAM EST PT 1/>: CPT

## 2024-03-19 PROCEDURE — 92134 CPTRZ OPH DX IMG PST SGM RTA: CPT

## 2024-03-19 ASSESSMENT — TONOMETRY
OD_IOP_MMHG: 16
OS_IOP_MMHG: 16

## 2024-03-19 ASSESSMENT — VISUAL ACUITY
OD_SC: J1
OU_SC: 20/30-1
OS_SC: 20/40
OS_SC: J2
OD_SC: 20/50-1
OU_SC: J1

## 2024-06-24 NOTE — PATIENT DISCUSSION
Continue to MONITOR CLOSELY to determine the need for TREATMENT and INCREASE/DECREASE in length of time till next follow up visit. No Protocol  Next appt: none  Last OV: 8/21/23  Last refill: 3/29/24, 90 tablets

## 2024-08-27 NOTE — PATIENT DISCUSSION
BMI above normal limits, recommended weight loss, improve diet and follow up with internist. Good afternoon,  Mrs. Torres had a C-scope in 11/2016.  The records are in [facility] when I search colonoscopy as a scanned document.  Could you help me update her HM?  Sincerely, Ahsan fair balance